# Patient Record
Sex: MALE | URBAN - METROPOLITAN AREA
[De-identification: names, ages, dates, MRNs, and addresses within clinical notes are randomized per-mention and may not be internally consistent; named-entity substitution may affect disease eponyms.]

---

## 2017-11-15 ENCOUNTER — OFFICE VISIT (OUTPATIENT)
Dept: PODIATRY | Age: 82
End: 2017-11-15
Payer: COMMERCIAL

## 2017-11-15 VITALS — HEIGHT: 70 IN | BODY MASS INDEX: 22.9 KG/M2 | WEIGHT: 160 LBS

## 2017-11-15 DIAGNOSIS — E11.51 TYPE II DIABETES MELLITUS WITH PERIPHERAL CIRCULATORY DISORDER (HCC): ICD-10-CM

## 2017-11-15 DIAGNOSIS — E11.42 DIABETIC POLYNEUROPATHY ASSOCIATED WITH TYPE 2 DIABETES MELLITUS (HCC): Primary | ICD-10-CM

## 2017-11-15 DIAGNOSIS — B35.1 DERMATOPHYTOSIS OF NAIL: ICD-10-CM

## 2017-11-15 PROCEDURE — 99213 OFFICE O/P EST LOW 20 MIN: CPT | Performed by: PODIATRIST

## 2017-11-15 PROCEDURE — 11721 DEBRIDE NAIL 6 OR MORE: CPT | Performed by: PODIATRIST

## 2017-11-15 RX ORDER — DIGOXIN 250 MCG
TABLET ORAL
COMMUNITY
Start: 2017-09-13

## 2017-11-15 RX ORDER — FUROSEMIDE 20 MG/1
TABLET ORAL
COMMUNITY
Start: 2017-10-28

## 2017-11-15 RX ORDER — IBUPROFEN 400 MG/1
TABLET ORAL
COMMUNITY
Start: 2017-10-04

## 2017-11-15 RX ORDER — CIPROFLOXACIN 500 MG/1
TABLET, FILM COATED ORAL
COMMUNITY
Start: 2017-10-23

## 2017-11-15 RX ORDER — TAMSULOSIN HYDROCHLORIDE 0.4 MG/1
CAPSULE ORAL
COMMUNITY
Start: 2017-10-23

## 2017-11-15 RX ORDER — LISINOPRIL AND HYDROCHLOROTHIAZIDE 25; 20 MG/1; MG/1
TABLET ORAL
COMMUNITY
Start: 2017-10-28

## 2017-11-15 RX ORDER — OXYBUTYNIN CHLORIDE 5 MG/1
TABLET, EXTENDED RELEASE ORAL
COMMUNITY
Start: 2017-10-23

## 2017-11-15 RX ORDER — TERAZOSIN 2 MG/1
CAPSULE ORAL
COMMUNITY
Start: 2017-10-28

## 2017-11-15 NOTE — PROGRESS NOTES
[facility]  Return Patient    Chief Complaint   Patient presents with    Diabetes     [facility] & [place]    Peripheral Neuropathy       Subjective: Juliet Devi comes to clinic for Diabetes [facility] & [place]) and Peripheral Neuropathy    he is a diabetic and states that he does not regularly measure his blood sugars. Pt currently has complaint of thickened, elongated nails that they cannot manage by themselves. Pt's primary care physician is Heather Oppenheim   Pt's last blood sugar was 130 . No results found for: LABA1C   Complains of numbness in the feet bilat. History reviewed. No pertinent past medical history. No Known Allergies  No current outpatient prescriptions on file prior to visit. No current facility-administered medications on file prior to visit. Review of Systems          Objective:  General: AAO x 3 in NAD. Derm  Toenail Description  Sites of Onychomycosis Involvement (Check affected area)  [x] [x] [x] [x] [x] [x] [x] [x] [x] [x]  5 4 3 2 1 1 2 3 4 5                          Right                                        Left    Thickness  [x] [x] [x] [x] [x] [x] [x] [x] [x] [x]  5 4 3 2 1 1 2 3 4 5                         Right                                        Left    Dystrophic Changes   [x] [x] [x] [x] [x] [x] [x] [x] [x] [x]  5 4 3 2 1 1 2 3 4 5                         Right                                        Left    Color   [x] [x] [x] [x] [x] [x] [x] [x] [x] [x]  5 4 3 2 1 1 2 3 4 5                          Right                                        Left    Incurvation/Ingrowin   [] [] [] [] [] [] [] [] [] []  5 4 3 2 1 1 2 3 4 5                         Right                                        Left    Inflammation/Pain   [x] [x] [x] [x] [x] [x] [x] [x] [x] [x]  5 4 3 2 1 1 2 3 4 5                         Right                                        Left      Dermatologic Exam:  Skin lesion/ulceration Absent . Skin No rashes or nodules noted. Canary Malady Musculoskeletal:     1st MPJ ROM decreased, Bilateral.  Muscle strength 5/5, Bilateral.  Pain present upon palpation of toenails 1-5, Bilateral. decreased medial longitudinal arch, Bilateral.  Ankle ROM decreased,Bilateral.    Dorsally contracted digits present digits 2, Bilateral.     Vascular: DP and PT pulses palpable 1/4, Bilateral.  CFT <5 seconds, Bilateral.  Hair growth absent to the level of the digits, Bilateral.  Edema present, Bilateral.  Varicosities absent, Bilateral. Erythema absent, Bilateral    Neurological: Sensation diminshed to light touch to level of digits, Bilateral.  Protective sensation intact 6/10 sites via 5.07/10g [place] Monofilament, Bilateral.  negative Tinel's, Bilateral.  negative Valleix sign, Bilateral.      Integument: Warm, dry, supple, Bilateral.  Open lesion absent, Bilateral.  Interdigital maceration absent to web spaces 4, Bilateral.  Nails 1-5 left and 1-5 right thickened > 3.0 mm, dystrophic and crumbly, discolored with subungual debris. Fissures absent, Bilateral.        Assessment:  80 y.o. male with:  1. Diabetic polyneuropathy associated with type 2 diabetes mellitus ([facility])  97759 - [place] DEBRIDEMENT OF NAILS, 6 OR MORE    HM DIABETES FOOT EXAM   2. Type II diabetes mellitus with peripheral circulatory disorder ([facility])  [address]111 - VA DEBRIDEMENT OF NAILS, 6 OR MORE    HM DIABETES FOOT EXAM   3. Dermatophytosis of nail  69138 - [place] DEBRIDEMENT OF NAILS, 6 OR MORE           Q7   []Yes    []No                Q8   [x]Yes    []No                     Q9   []Yes    []No    Plan:   Pt was evaluated and examined. Patient was given personalized discharge instructions. Nails 1-10 were debrided sharply in length and thickness with a nipper and , without incident. Pt will follow up in 9 weeks or sooner if any problems arise. Diagnosis was discussed with the pt and all of their questions were answered in detail. Proper foot hygiene and care was discussed with the pt. Informed patient on proper diabetic foot care and importance of tight glycemic control. Continue with Vixx to toenails daily. Patient to check feet daily and contact the office with any questions/problems/concerns.    Other comorbidity noted and will be managed by PCP.  11/15/2017    Electronically signed by Dr. Nandini Garcia DPM  on 11/15/2017 at 10:28 AM

## 2018-02-15 ENCOUNTER — OFFICE VISIT (OUTPATIENT)
Dept: PODIATRY | Age: 83
End: 2018-02-15
Payer: COMMERCIAL

## 2018-02-15 VITALS
WEIGHT: 160 LBS | HEART RATE: 68 BPM | RESPIRATION RATE: 16 BRPM | HEIGHT: 67 IN | BODY MASS INDEX: 25.11 KG/M2 | TEMPERATURE: 98.2 F

## 2018-02-15 DIAGNOSIS — M79.671 PAIN IN BOTH FEET: ICD-10-CM

## 2018-02-15 DIAGNOSIS — R60.0 EDEMA OF LOWER EXTREMITY: ICD-10-CM

## 2018-02-15 DIAGNOSIS — M79.672 PAIN IN BOTH FEET: ICD-10-CM

## 2018-02-15 DIAGNOSIS — I73.9 PVD (PERIPHERAL VASCULAR DISEASE) (HCC): ICD-10-CM

## 2018-02-15 DIAGNOSIS — B35.1 DERMATOPHYTOSIS OF NAIL: Primary | ICD-10-CM

## 2018-02-15 DIAGNOSIS — E11.51 TYPE II DIABETES MELLITUS WITH PERIPHERAL CIRCULATORY DISORDER (HCC): ICD-10-CM

## 2018-02-15 PROCEDURE — 11721 DEBRIDE NAIL 6 OR MORE: CPT | Performed by: PODIATRIST

## 2018-02-15 PROCEDURE — 99213 OFFICE O/P EST LOW 20 MIN: CPT | Performed by: PODIATRIST

## 2018-02-15 NOTE — PROGRESS NOTES
Left    Incurvation/Ingrowin   [] [] [] [] [] [] [] [] [] []  5 4 3 2 1 1 2 3 4 5                         Right                                        Left    Inflammation/Pain   [x] [x] [x] [x] [x] [x] [x] [x] [x] [x]  5 4 3 2 1 1 2 3 4 5                         Right                                        Left      Dermatologic Exam:  Skin lesion/ulceration Absent . Skin No rashes or nodules noted. .       Musculoskeletal:     1st MPJ ROM decreased, Bilateral.  Muscle strength 5/5, Bilateral.  Pain present upon palpation of toenails 1-5, Bilateral. decreased medial longitudinal arch, Bilateral.  Ankle ROM decreased,Bilateral.    Dorsally contracted digits present digits 2, Bilateral.     Vascular: DP and PT pulses palpable 1/4, Bilateral.  CFT <5 seconds, Bilateral.  Hair growth absent to the level of the digits, Bilateral.  Edema present, Bilateral.  Varicosities absent, Bilateral. Erythema absent, Bilateral    Neurological: Sensation diminshed to light touch to level of digits, Bilateral.  Protective sensation intact 6/10 sites via 5.07/10g [place]Garry Monofilament, Bilateral.  negative Tinel's, Bilateral.  negative Valleix sign, Bilateral.      Integument: Warm, dry, supple, Bilateral.  Open lesion absent, Bilateral.  Interdigital maceration absent to web spaces 4, Bilateral.  Nails 1-5 left and 1-5 right thickened > 3.0 mm, dystrophic and crumbly, discolored with subungual debris. Fissures absent, Bilateral.     Visual inspection:  Deformity: hammertoe deformity meliza feet  amputation: absent  Skin lesions: absent  Edema: right- 2+ pitting edema, left- 2+ pitting edema    Sensory exam:  Monofilament sensation: abnormal - 6/10 via SW 5.07/10g monofilament to the plantar foot bilateral feet    Pulses: abnormal - 1/4 dorsalis pedis pulse and 1/4 Posterior tibial pulse,   Pinprick: Impaired  Proprioception: Impaired  Vibration (128 Hz):  Impaired         DM with PVD       [x]Yes

## 2018-05-30 ENCOUNTER — TELEPHONE (OUTPATIENT)
Dept: PODIATRY | Age: 83
End: 2018-05-30